# Patient Record
Sex: MALE | Race: WHITE
[De-identification: names, ages, dates, MRNs, and addresses within clinical notes are randomized per-mention and may not be internally consistent; named-entity substitution may affect disease eponyms.]

---

## 2018-10-23 ENCOUNTER — HOSPITAL ENCOUNTER (OUTPATIENT)
Dept: HOSPITAL 31 - C.3T | Age: 68
Setting detail: OBSERVATION
LOS: 2 days | Discharge: HOME | End: 2018-10-25
Attending: INTERNAL MEDICINE | Admitting: INTERNAL MEDICINE
Payer: MEDICARE

## 2018-10-23 VITALS — RESPIRATION RATE: 20 BRPM

## 2018-10-23 DIAGNOSIS — N39.0: Primary | ICD-10-CM

## 2018-10-23 DIAGNOSIS — I10: ICD-10-CM

## 2018-10-23 DIAGNOSIS — Z79.82: ICD-10-CM

## 2018-10-23 DIAGNOSIS — I25.10: ICD-10-CM

## 2018-10-23 DIAGNOSIS — Z95.5: ICD-10-CM

## 2018-10-23 DIAGNOSIS — R31.0: ICD-10-CM

## 2018-10-23 DIAGNOSIS — F17.200: ICD-10-CM

## 2018-10-23 LAB
ALBUMIN SERPL-MCNC: 4.4 G/DL (ref 3.5–5)
ALBUMIN/GLOB SERPL: 1.4 {RATIO} (ref 1–2.1)
ALT SERPL-CCNC: 21 U/L (ref 21–72)
APTT BLD: 28 SECONDS (ref 21–34)
AST SERPL-CCNC: 19 U/L (ref 17–59)
BACTERIA #/AREA URNS HPF: (no result) /[HPF]
BASOPHILS # BLD AUTO: 0 K/UL (ref 0–0.2)
BASOPHILS NFR BLD: 0.4 % (ref 0–2)
BILIRUB UR-MCNC: NEGATIVE MG/DL
BUN SERPL-MCNC: 18 MG/DL (ref 9–20)
CALCIUM SERPL-MCNC: 9.3 MG/DL (ref 8.6–10.4)
EOSINOPHIL # BLD AUTO: 0 K/UL (ref 0–0.7)
EOSINOPHIL NFR BLD: 0.1 % (ref 0–4)
ERYTHROCYTE [DISTWIDTH] IN BLOOD BY AUTOMATED COUNT: 14.1 % (ref 11.5–14.5)
GFR NON-AFRICAN AMERICAN: 43
GLUCOSE UR STRIP-MCNC: NORMAL MG/DL
HDLC SERPL-MCNC: 27 MG/DL (ref 30–70)
HGB BLD-MCNC: 14.5 G/DL (ref 12–18)
INR PPP: 1.2
LDLC SERPL-MCNC: 122 MG/DL (ref 0–129)
LEUKOCYTE ESTERASE UR-ACNC: (no result) LEU/UL
LYMPHOCYTE: 4 % (ref 20–40)
LYMPHOCYTES # BLD AUTO: 0.7 K/UL (ref 1–4.3)
LYMPHOCYTES NFR BLD AUTO: 6.6 % (ref 20–40)
MCH RBC QN AUTO: 29.7 PG (ref 27–31)
MCHC RBC AUTO-ENTMCNC: 33.5 G/DL (ref 33–37)
MCV RBC AUTO: 88.5 FL (ref 80–94)
MONOCYTE: 3 % (ref 0–10)
MONOCYTES # BLD: 0.7 K/UL (ref 0–0.8)
MONOCYTES NFR BLD: 6.5 % (ref 0–10)
NEUTROPHILS # BLD: 9.4 K/UL (ref 1.8–7)
NEUTROPHILS NFR BLD AUTO: 86.4 % (ref 50–75)
NEUTROPHILS NFR BLD AUTO: 90 % (ref 50–75)
NEUTS BAND NFR BLD: 3 % (ref 0–2)
NRBC BLD AUTO-RTO: 0 % (ref 0–2)
PH UR STRIP: 7 [PH] (ref 5–8)
PLATELET # BLD EST: NORMAL 10*3/UL
PLATELET # BLD: 198 K/UL (ref 130–400)
PMV BLD AUTO: 9 FL (ref 7.2–11.7)
PROT UR STRIP-MCNC: (no result) MG/DL
PROTHROMBIN TIME: 13.1 SECONDS (ref 9.7–12.2)
RBC # BLD AUTO: 4.89 MIL/UL (ref 4.4–5.9)
RBC # UR STRIP: (no result) /UL
RBC MORPH BLD: NORMAL
SP GR UR STRIP: 1.02 (ref 1–1.03)
TOTAL CELLS COUNTED BLD: 100
UROBILINOGEN UR-MCNC: NORMAL MG/DL (ref 0.2–1)
WBC # BLD AUTO: 10.9 K/UL (ref 4.8–10.8)
WBC CLUMPS # UR AUTO: (no result) /HPF

## 2018-10-23 PROCEDURE — 84100 ASSAY OF PHOSPHORUS: CPT

## 2018-10-23 PROCEDURE — 85025 COMPLETE CBC W/AUTO DIFF WBC: CPT

## 2018-10-23 PROCEDURE — 80053 COMPREHEN METABOLIC PANEL: CPT

## 2018-10-23 PROCEDURE — 83605 ASSAY OF LACTIC ACID: CPT

## 2018-10-23 PROCEDURE — 84145 PROCALCITONIN (PCT): CPT

## 2018-10-23 PROCEDURE — 83735 ASSAY OF MAGNESIUM: CPT

## 2018-10-23 PROCEDURE — 80061 LIPID PANEL: CPT

## 2018-10-23 PROCEDURE — 81001 URINALYSIS AUTO W/SCOPE: CPT

## 2018-10-23 PROCEDURE — 36592 COLLECT BLOOD FROM PICC: CPT

## 2018-10-23 PROCEDURE — 36415 COLL VENOUS BLD VENIPUNCTURE: CPT

## 2018-10-23 PROCEDURE — 83036 HEMOGLOBIN GLYCOSYLATED A1C: CPT

## 2018-10-23 PROCEDURE — 87086 URINE CULTURE/COLONY COUNT: CPT

## 2018-10-23 PROCEDURE — 88104 CYTOPATH FL NONGYN SMEARS: CPT

## 2018-10-23 PROCEDURE — 87040 BLOOD CULTURE FOR BACTERIA: CPT

## 2018-10-23 PROCEDURE — 71046 X-RAY EXAM CHEST 2 VIEWS: CPT

## 2018-10-23 PROCEDURE — 85610 PROTHROMBIN TIME: CPT

## 2018-10-23 PROCEDURE — 76700 US EXAM ABDOM COMPLETE: CPT

## 2018-10-23 PROCEDURE — 85730 THROMBOPLASTIN TIME PARTIAL: CPT

## 2018-10-23 NOTE — CP.PCM.PN
Subjective





- Date & Time of Evaluation


Date of Evaluation: 10/23/18


Time of Evaluation: 14:40





- Subjective


Subjective: 











PROGRESS NOTE.














DR. DU.














This is a 69 yo male with past medical history of hypertension, bacteremia, 

renal stones, fx of mandible, cardiac stent placement, presenting today as a 

direct admit to [facility]. 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 Patient reports hematuria, polyuria, and intense dysuria x 2 days. 

Patient says that he was also passing blood clots in his urine and the hematuria

was occurring every time he would urinate. He has been on asa 81 mg po daily. He

also reports low grade fever at home. 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 Found to have leukocytosis of

23,000. Patient self prescribed ciprofloxacin 500 bid and pencillin BID. Was 

taking and starting to feel better, but woke up at 5 am this morning with 

returning and worsening sx. Patient spoke to Dr. Du who advised him that he 

should seek inpatient admission and IV antibiotics. Patient currently denies, 

chest pain, fever, cough, shortness of breath, abdominal pain, syncope. 











PMH: Fx of mandible, bacteremia, HTN, renal stones








PSH: cardiac stent placed in 2007








Home meds: bystolic, asa








Allergies: NKDA








FH: DM, HTN in family











Social hx: Current smoker. 1 pack per day for 30 plus yrs. Works as a 

gastroenterologist. 

















Objective





- Medications


Medications: 


                               Current Medications





Meropenem 500 mg/ Sodium (Chloride)  100 mls @ 100 mls/hr IVPB Q8H [facility]; Protocol


Sodium Chloride (Sodium Chloride 0.9%)  1,000 mls @ 100 mls/hr IV .Q10H [facility]


Metoclopramide HCl (Reglan)  5 mg IVP Q8 PRN


   PRN Reason: Nausea/Vomiting


Pantoprazole Sodium (Protonix Ec Tab)  40 mg PO DAILY [facility]











- Constitutional


Appears: Non-toxic, No Acute Distress





- Head Exam


Head Exam: ATRAUMATIC, NORMAL INSPECTION, NORMOCEPHALIC





- Eye Exam


Eye Exam: EOMI





- ENT Exam


ENT Exam: Mucous Membranes Moist





- Neck Exam


Neck Exam: Full ROM, Normal Inspection





- Respiratory Exam


Respiratory Exam: absent: Respiratory Distress





- Cardiovascular Exam


Cardiovascular Exam: +S1, +S2





- GI/Abdominal Exam


GI & Abdominal Exam: Soft, Normal Bowel Sounds.  absent: Tenderness





- Extremities Exam


Extremities Exam: Full ROM, Normal Inspection





- Back Exam


Back Exam: NORMAL INSPECTION.  absent: CVA tenderness (L), CVA tenderness (R)





- Neurological Exam


Neurological Exam: Alert, Awake, Oriented x3





- Psychiatric Exam


Psychiatric exam: Normal Affect, Normal Mood





- Skin


Skin Exam: Dry, Intact, Normal Color, Warm





Assessment and Plan





- Assessment and Plan (Free Text)


Assessment: 




















This is a 69 yo male with past medical hx of CAD, HTN, renal stones, bacteremia,

presenting with 











1. Suspected sepsis











-secondary to UTI


-UA, urine culture pending


-hold asa secondary to hematuria


-blood cultures x 2 pending


-start IV merrem 500 mg q 8 hrs (day one)


-ID consult. recs appreciated. Dr. Mcarthur


-lactic acid pending


-tylenol 650 mg po q 6 hrs prn fever


-CXR pending 


- cc/hr


-procalcitonin pending


-abdominal ultrasound pending


-qSOFA score of 0: not high risk 














2. Hematuria











-ua and urine culture pending


-hold asa


-urology consult. Dr. Coronado. recs appreciated.


-PT/PTT pending 











3. Hx of HTN








-continue to monitor 














4. Hx of CAD








-continue to monitor


-lipid panel


-will check HGB a1c. 











5. Tobacco abuse








-encourage smoking cessation 











6. GI/DVT ppx








-protonix 40 mg daily


-reglan 5 mg iv q 8 hrs 


-SCDs

















discussed with Dr. Du.

## 2018-10-23 NOTE — CP.PCM.PCO
Addendum entered and electronically signed by Vinayak Yeung  10/24/18 00:19: 


House Doctor Paged for patient complaining pain on urination. 





Patient seen and examined at beside. Patient admitted for UTI. Patient currently

on IV abx treatment but continues to have pain on urination. Patient 

additionally is stating he is having abdominal discomfort from the abx. Patient 

has no other complaints. 





Patient given 1 X Phenalzopyridine 100 mg PO & 1X Protonix 40 mg PO to relieve 

symptoms. 





Original Note:

## 2018-10-24 VITALS — TEMPERATURE: 98.8 F | OXYGEN SATURATION: 97 %

## 2018-10-24 LAB
ALBUMIN SERPL-MCNC: 3.6 G/DL (ref 3.5–5)
ALBUMIN/GLOB SERPL: 1.2 {RATIO} (ref 1–2.1)
ALT SERPL-CCNC: 16 U/L (ref 21–72)
AST SERPL-CCNC: 21 U/L (ref 17–59)
BASOPHILS # BLD AUTO: 0 K/UL (ref 0–0.2)
BASOPHILS NFR BLD: 0.5 % (ref 0–2)
BUN SERPL-MCNC: 18 MG/DL (ref 9–20)
CALCIUM SERPL-MCNC: 8.9 MG/DL (ref 8.6–10.4)
EOSINOPHIL # BLD AUTO: 0 K/UL (ref 0–0.7)
EOSINOPHIL NFR BLD: 0.4 % (ref 0–4)
ERYTHROCYTE [DISTWIDTH] IN BLOOD BY AUTOMATED COUNT: 14 % (ref 11.5–14.5)
GFR NON-AFRICAN AMERICAN: 47
HGB BLD-MCNC: 14.1 G/DL (ref 12–18)
LYMPHOCYTES # BLD AUTO: 1.1 K/UL (ref 1–4.3)
LYMPHOCYTES NFR BLD AUTO: 13.1 % (ref 20–40)
MCH RBC QN AUTO: 29.7 PG (ref 27–31)
MCHC RBC AUTO-ENTMCNC: 33.8 G/DL (ref 33–37)
MCV RBC AUTO: 87.9 FL (ref 80–94)
MONOCYTES # BLD: 1.1 K/UL (ref 0–0.8)
MONOCYTES NFR BLD: 13.1 % (ref 0–10)
NEUTROPHILS # BLD: 5.9 K/UL (ref 1.8–7)
NEUTROPHILS NFR BLD AUTO: 72.9 % (ref 50–75)
NRBC BLD AUTO-RTO: 0.1 % (ref 0–2)
PLATELET # BLD: 189 K/UL (ref 130–400)
PMV BLD AUTO: 8.8 FL (ref 7.2–11.7)
RBC # BLD AUTO: 4.76 MIL/UL (ref 4.4–5.9)
WBC # BLD AUTO: 8.1 K/UL (ref 4.8–10.8)

## 2018-10-24 RX ADMIN — PANTOPRAZOLE SODIUM SCH MG: 40 TABLET, DELAYED RELEASE ORAL at 10:25

## 2018-10-24 NOTE — US
HISTORY:

leukocytosis/sepsis



COMPARISON:

None available.



TECHNIQUE:

Sonographic evaluation of the abdomen.



FINDINGS:



LIVER:

Measures 17.6 cm in sagittal dimension.  Echogenic liver may be seen 

in setting of hepatic parenchymal disease or fatty infiltration. No 

focal hepatic mass identified. The main portal vein appears patent 

with normal directional flow.   No intrahepatic bile duct dilatation.



GALLBLADDER:

No gallstones. No gallbladder wall thickening. Negative sonographic 

Moran's sign as assessed by the sonographer.



COMMON BILE DUCT:

Measures 5 mm. 



PANCREAS:

Not well visualized.



RIGHT KIDNEY:

Measures 11.6 x 5.8 x 5.8 cm.  No obstructing calculus or 

hydronephrosis identified.  2.8 x 2.4 x 3.3 cm midpole cyst. 



LEFT KIDNEY:

Measures 10.7 x 4.1 x 5.1 cm.  No obstructing calculus or 

hydronephrosis identified.  Punctate nonobstructing midpole calculus. 



SPLEEN:

Measures approximately 11.6 cm. 



AORTA:

Limited views appear unremarkable. 



IVC:

Limited views appear unremarkable. 



OTHER FINDINGS:

None. 



IMPRESSION:

Echogenic liver may be seen in setting of hepatic parenchymal disease 

or fatty infiltration. 



3.3 cm midpole right renal cyst. 



Punctate nonobstructing left renal calculus. 



Preliminary impression was provided by [facility].

## 2018-10-24 NOTE — RAD
HISTORY:

Fever 



COMPARISON:

No prior.



TECHNIQUE:

Chest PA and lateral



FINDINGS:



LINES AND TUBES:

None. 



LUNG AND PLEURA:

The lungs are well inflated and clear. No pleural effusion or 

pneumothorax.



HEART AND MEDIASTINUM:

The heart is not enlarged.  No aortic atherosclerotic calcification 

present.  The hilar and mediastinal contours are within normal limits.



SKELETAL STRUCTURES:

The bony structures are within normal limits for the patient's age.



VISUALIZED UPPER ABDOMEN:

Normal.



OTHER FINDINGS:

None.



IMPRESSION:

No active pulmonary disease.

## 2018-10-24 NOTE — CP.PCM.PN
Subjective





- Date & Time of Evaluation


Date of Evaluation: 10/24/18


Time of Evaluation: 08:00





- Subjective


Subjective: 





PGY 3 medicine progress note for Dr. Nelson:











Patient was seen and examined at bedside this morning. Patient states he still 

has so dysuria but has greatly improved since admission. Patient has no other 

complaints at this time. Would like to go home today.





Objective





- Vital Signs/Intake and Output


Vital Signs (last 24 hours): 


                                        











Temp Pulse Resp BP Pulse Ox


 


 98.1 F   60   20   161/74 H  96 


 


 10/24/18 08:00  10/24/18 08:00  10/24/18 08:00  10/24/18 08:00  10/24/18 08:00








Intake and Output: 


                                        











 10/24/18 10/24/18





 06:59 18:59


 


Intake Total 1000 


 


Balance 1000 














- Medications


Medications: 


                               Current Medications





Acetaminophen (Tylenol 325mg Tab)  650 mg PO Q6 PRN


   PRN Reason: Fever >100.4 F


   Last Admin: 10/24/18 00:17 Dose:  650 mg


Aspirin (Aspirin Chewable)  81 mg PO DAILY [facility]


   Last Admin: 10/24/18 10:25 Dose:  81 mg


Meropenem 500 mg/ Sodium (Chloride)  100 mls @ 100 mls/hr IVPB Q8H [facility]; Protocol


   Last Admin: 10/24/18 06:43 Dose:  100 mls/hr


Sodium Chloride (Sodium Chloride 0.9%)  1,000 mls @ 100 mls/hr IV .Q10H [facility]


   Last Admin: 10/24/18 00:30 Dose:  Not Given


Influenza Virus Vaccine (Fluzone Quad [id number])  60 mcg IM .ONCE ONE


   Stop: 10/26/18 10:01


Metoclopramide HCl (Reglan)  5 mg IVP Q8 PRN


   PRN Reason: Nausea/Vomiting


   Last Admin: 10/23/18 20:54 Dose:  5 mg


Nebivolol (Bystolic)  10 mg PO DAILY [facility]


Pantoprazole Sodium (Protonix Ec Tab)  40 mg PO DAILY [facility]


   Last Admin: 10/24/18 10:25 Dose:  40 mg


Phenazopyridine HCl (Pyridium)  200 mg PO TID PRN


   PRN Reason: Urinary discomt


   Last Admin: 10/24/18 12:49 Dose:  200 mg


Pneumococcal Polyvalent Vaccine (Pneumovax 23 Vaccine)  0.5 ml IM .ONCE ONE


   Stop: 10/26/18 10:01











- Labs


Labs: 


                                        





                                 10/24/18 08:00 





                                 10/24/18 08:01 





                                        











PT  13.1 SECONDS (9.7-12.2)  H  10/23/18  15:45    


 


INR  1.2   10/23/18  15:45    


 


APTT  28 SECONDS (21-34)   10/23/18  15:45    














- Constitutional


Appears: Non-toxic, No Acute Distress





- Head Exam


Head Exam: NORMAL INSPECTION





- Eye Exam


Eye Exam: EOMI, PERRL


Pupil Exam: NORMAL ACCOMODATION





- ENT Exam


ENT Exam: Mucous Membranes Moist





- Respiratory Exam


Respiratory Exam: Clear to Ausculation Bilateral, NORMAL BREATHING PATTERN.  

absent: Respiratory Distress





- Cardiovascular Exam


Cardiovascular Exam: REGULAR RHYTHM, +S1, +S2





- GI/Abdominal Exam


GI & Abdominal Exam: Soft, Normal Bowel Sounds.  absent: Distended, Firm, 

Guarding, Tenderness





- Extremities Exam


Extremities Exam: Normal Inspection





- Back Exam


Back Exam: NORMAL INSPECTION.  absent: CVA tenderness (L), CVA tenderness (R), 

paraspinal tenderness





- Neurological Exam


Neurological Exam: Alert, Awake, CN II-XII Intact, Normal Gait





- Psychiatric Exam


Psychiatric exam: Normal Affect, Normal Mood





- Skin


Skin Exam: Normal Color





Assessment and Plan





- Assessment and Plan (Free Text)


Assessment: 








This is a 69 yo male with past medical hx of CAD, HTN, renal stones, bacteremia,

presenting with 











1. Suspected sepsis











UTI with Sepsis criteria


Febrile, WBC 22


with hematuria and dysuria


UA, urine culture pending


blood cultures - prelim negative


IV Merrem 500 mg q 8 hrs (started 10/23)


ID consult. recs appreciated. Dr. Mcarthur


Lactic acid normal


Tylenol 650 mg po q 6 hrs prn fever


Pyridium 200mg PO PRN urinary discomfort


CXR pending - normal


Procalcitonin negative


abdominal ultrasound pending


qSOFA score of 0: not high risk 





Hematuria and Dysuria


Resolving 


f/u urine cytology


Urine culture pending


Urology consult. Dr. Coronado. recs appreciated -> rec cysto but will hold off for

now due to active infection


Flomax 0.4 mg PO PCS





Hypertension


Bistolic 10mg PO daily





CAD


Asa 81mg PO daily





Tobacco abuse


Encouraged smoking cessation 


Patient refuses nicotine path





Prophylaxis


Protonix 40 mg daily


Reglan 5 mg iv q 8 hrs 


SCDs








Dispo -> pending authorization for Itrapenem 1gram IV daily via home infusion. 

Likely dc tomorrow 10/25 early in the morning. 





*All management and order per Dr. Nelson

## 2018-10-24 NOTE — CP.PCM.CON
History of Present Illness





- History of Present Illness


History of Present Illness: 





69 yo male  admitted with acute UTI    Patient reports hematuria, polyuria, and 

intense dysuria x 2 days. Patient says that he was also passing blood clots in 

his urine and the hematuria was occurring every time he would urinate. . Found 

to have leukocytosis of 23,000. Patient self prescribed ciprofloxacin 500 bid 








PMH: Fx of mandible, bacteremia, HTN, renal stones


PSH: cardiac stent placed in 2007


Home meds: bystolic, asa


Allergies: NKDA


FH: DM, HTN in family


Social hx: Current smoker. 1 pack per day for 30 plus yrs. Works as a 

gastroenterologist. 














Past Patient History





- Past Medical History & Family History


Past Medical History?: Yes





- Past Social History


Smoking Status: Current Some Days Smoker





- CARDIAC


Hx Cardiac Disorders: No





- PULMONARY


Hx Respiratory Disorders: No





- NEUROLOGICAL


Hx Neurological Disorder: No





- HEENT


Hx HEENT Problems: No





- RENAL


Hx Chronic Kidney Disease: No





- ENDOCRINE/METABOLIC


Hx Endocrine Disorders: No





- HEMATOLOGICAL/ONCOLOGICAL


Hx Blood Disorders: No





- INTEGUMENTARY


Hx Dermatological Problems: No





- MUSCULOSKELETAL/RHEUMATOLOGICAL


Hx Musculoskeletal Disorders: No


Hx Falls: No





- GASTROINTESTINAL


Hx Gastrointestinal Disorders: No





- GENITOURINARY/GYNECOLOGICAL


Hx Genitourinary Disorders: No





- PSYCHIATRIC


Hx Psychophysiologic Disorder: No


Hx Substance Use: Yes





- SURGICAL HISTORY


Hx Surgeries: Yes


Hx Coronary Stent: Yes (2007)





- ANESTHESIA


Hx Anesthesia: Yes


Hx Anesthesia Reactions: No


Hx Malignant Hyperthermia: No


Has any member of the family had a problem w/ anesthesia?: No





Meds


Home Medications: 


                              Home Medication List











 Medication  Instructions  Recorded  Confirmed  Type


 


RX: Aspirin [Aspirin Chewable] 81 mg PO DAILY  chew 10/25/18  Rx


 


RX: Ertapenem [Invanz] 1 gm IVPB ONCE  vial 10/25/18  Rx


 


RX: Phenazopyridine [Pyridium] 200 mg PO TID PRN #3 tab 10/25/18  Rx


 


RX: Tamsulosin [Flomax] 0.4 mg PO PCS  cap 10/25/18  Rx











Allergies/Adverse Reactions: 


                                    Allergies











Allergy/AdvReac Type Severity Reaction Status Date / Time


 


No Known Allergies Allergy   Verified 10/23/18 13:59














- Medications


Medications: 


                               Current Medications





Acetaminophen (Tylenol 325mg Tab)  650 mg PO Q6 PRN


   PRN Reason: Fever >100.4 F


   Last Admin: 10/24/18 00:17 Dose:  650 mg


Aspirin (Aspirin Chewable)  81 mg PO DAILY [facility]


   Last Admin: 10/24/18 10:25 Dose:  81 mg


Ertapenem 1 gm/ Sodium (Chloride)  50 mls @ 100 mls/hr IVPB ONCE ONE; Protocol


   Stop: 10/25/18 07:29


Meropenem 500 mg/ Sodium (Chloride)  100 mls @ 100 mls/hr IVPB Q8H [facility]; Protocol


Influenza Virus Vaccine (Fluzone Quad [id number])  60 mcg IM .ONCE ONE


   Stop: 10/26/18 10:01


Metoclopramide HCl (Reglan)  5 mg IVP Q8 PRN


   PRN Reason: Nausea/Vomiting


   Last Admin: 10/23/18 20:54 Dose:  5 mg


Nebivolol (Bystolic)  10 mg PO DAILY [facility]


Pantoprazole Sodium (Protonix Ec Tab)  40 mg PO DAILY [facility]


   Last Admin: 10/24/18 10:25 Dose:  40 mg


Phenazopyridine HCl (Pyridium)  200 mg PO TID PRN


   PRN Reason: Urinary discomt


   Last Admin: 10/24/18 12:49 Dose:  200 mg


Pneumococcal Polyvalent Vaccine (Pneumovax 23 Vaccine)  0.5 ml IM .ONCE ONE


   Stop: 10/26/18 10:01


Tamsulosin HCl (Flomax)  0.4 mg PO PCS [facility]


   Last Admin: 10/24/18 17:15 Dose:  0.4 mg











Physical Exam





- Constitutional


Appears: Chronically Ill





- Head Exam


Head Exam: ATRAUMATIC, NORMOCEPHALIC





- Eye Exam


Eye Exam: absent: Scleral icterus





- ENT Exam


ENT Exam: Mucous Membranes Dry, Normal External Ear Exam





- Neck Exam


Neck exam: Negative for: Lymphadenopathy





- Respiratory Exam


Respiratory Exam: Decreased Breath Sounds, Clear to Auscultation Bilateral





- Cardiovascular Exam


Cardiovascular Exam: REGULAR RHYTHM, +S1, +S2





- GI/Abdominal Exam


GI & Abdominal Exam: Diminished Bowel Sounds, Soft.  absent: Tenderness





- Rectal Exam


Rectal Exam: Deferred





-  Exam


 Exam: NORMAL INSPECTION





- Extremities Exam


Extremities exam: Negative for: pedal edema





- Back Exam


Back exam: absent: CVA tenderness (L), CVA tenderness (R)





- Neurological Exam


Neurological exam: Alert, CN II-XII Intact, Oriented x3, Reflexes Normal





- Psychiatric Exam


Psychiatric exam: Normal Mood





- Skin


Skin Exam: Dry





Results





- Vital Signs


Recent Vital Signs: 


                                Last Vital Signs











Temp  99.3 F   10/24/18 16:05


 


Pulse  60   10/24/18 16:05


 


Resp  20   10/24/18 16:05


 


BP  155/75 H  10/24/18 16:05


 


Pulse Ox  96   10/24/18 16:05














- Labs


Result Diagrams: 


                                 10/24/18 08:00





                                 10/24/18 08:01


Labs: 


                         Laboratory Results - last 24 hr











  10/23/18 10/24/18 10/24/18





  21:39 08:00 08:01


 


WBC   8.1 


 


RBC   4.76 


 


Hgb   14.1 


 


Hct   41.9 


 


MCV   87.9 


 


MCH   29.7 


 


MCHC   33.8 


 


RDW   14.0 


 


Plt Count   189 


 


MPV   8.8 


 


Neut % (Auto)   72.9 


 


Lymph % (Auto)   13.1 L 


 


Mono % (Auto)   13.1 H 


 


Eos % (Auto)   0.4 


 


Baso % (Auto)   0.5 


 


Neut # (Auto)   5.9 


 


Lymph # (Auto)   1.1 


 


Mono # (Auto)   1.1 H 


 


Eos # (Auto)   0.0 


 


Baso # (Auto)   0.0 


 


Sodium    141


 


Potassium    4.1


 


Chloride    106


 


Carbon Dioxide    23


 


Anion Gap    17


 


BUN    18


 


Creatinine    1.5


 


Est GFR ( Amer)    56


 


Est GFR (Non-Af Amer)    47


 


Random Glucose    112 H


 


Calcium    8.9


 


Phosphorus    3.4


 


Magnesium    2.1


 


Total Bilirubin    0.7


 


AST    21


 


ALT    16 L D


 


Alkaline Phosphatase    57


 


Total Protein    6.8


 


Albumin    3.6


 


Globulin    3.1


 


Albumin/Globulin Ratio    1.2


 


Urine Color  Yellow  


 


Urine Clarity  Hazy  


 


Urine pH  7.0  


 


Ur Specific Gravity  1.018  


 


Urine Protein  2+ H  


 


Urine Glucose (UA)  Normal  


 


Urine Ketones  Negative  


 


Urine Blood  3+ H  


 


Urine Nitrate  Positive H  


 


Urine Bilirubin  Negative  


 


Urine Urobilinogen  Normal  


 


Ur Leukocyte Esterase  3+ H  


 


Urine WBC (Auto)  479 H  


 


Urine RBC (Auto)  265 H  


 


Urine WBC Clumps (Auto)  Many H  


 


Urine Bacteria  Occ H  














Assessment & Plan


(1) UTI (urinary tract infection)


Status: Acute   





(2) Hematuria


Status: Acute   





- Assessment and Plan (Free Text)


Assessment: 





will need IV antibiotics and  follow up for cystoscopy

## 2018-10-25 VITALS — SYSTOLIC BLOOD PRESSURE: 153 MMHG | HEART RATE: 57 BPM | DIASTOLIC BLOOD PRESSURE: 90 MMHG

## 2018-10-25 RX ADMIN — PANTOPRAZOLE SODIUM SCH MG: 40 TABLET, DELAYED RELEASE ORAL at 09:01

## 2018-10-25 NOTE — CP.PCM.PN
Subjective





- Date & Time of Evaluation


Date of Evaluation: 10/25/18


Time of Evaluation: 09:26





- Subjective


Subjective: 


PGY2- medicine progress note for Dr. Nelson





Patient was seen and examined at bedside in no acute distress. Patient reports 

feeling better today. He admits to burning with urination, but states it is 

improving. He denies hematuria. The patient has no additional complaints and 

feels well. Denies chest pain, palpitations, dyspnea, cough, nausea, vomiting, 

diarrhea, constipation, abdominal pain, fevers, headaches, dizziness.





Objective





- Vital Signs/Intake and Output


Vital Signs (last 24 hours): 


                                        











Temp Pulse Resp BP Pulse Ox


 


 98.8 F   57 L  20   153/90 H  97 


 


 10/25/18 07:16  10/25/18 07:16  10/25/18 07:16  10/25/18 07:16  10/25/18 07:16








Intake and Output: 


                                        











 10/25/18 10/25/18





 06:59 18:59


 


Intake Total  390


 


Output Total  450


 


Balance  -60














- Medications


Medications: 


                               Current Medications





Acetaminophen (Tylenol 325mg Tab)  650 mg PO Q6 PRN


   PRN Reason: Fever >100.4 F


   Last Admin: 10/24/18 00:17 Dose:  650 mg


Aspirin (Aspirin Chewable)  81 mg PO DAILY [facility]


   Last Admin: 10/25/18 09:01 Dose:  81 mg


Meropenem 500 mg/ Sodium (Chloride)  100 mls @ 100 mls/hr IVPB Q8H [facility]; Protocol


   Last Admin: 10/25/18 09:10 Dose:  Not Given


Influenza Virus Vaccine (Fluzone Quad [id number])  60 mcg IM .ONCE ONE


   Stop: 10/26/18 10:01


Metoclopramide HCl (Reglan)  5 mg IVP Q8 PRN


   PRN Reason: Nausea/Vomiting


   Last Admin: 10/23/18 20:54 Dose:  5 mg


Nebivolol (Bystolic)  10 mg PO DAILY [facility]


   Last Admin: 10/25/18 06:02 Dose:  10 mg


Pantoprazole Sodium (Protonix Ec Tab)  40 mg PO DAILY [facility]


   Last Admin: 10/25/18 09:01 Dose:  40 mg


Phenazopyridine HCl (Pyridium)  200 mg PO TID PRN


   PRN Reason: Urinary discomt


   Last Admin: 10/25/18 09:04 Dose:  200 mg


Pneumococcal Polyvalent Vaccine (Pneumovax 23 Vaccine)  0.5 ml IM .ONCE ONE


   Stop: 10/26/18 10:01


Tamsulosin HCl (Flomax)  0.4 mg PO PCS [facility]


   Last Admin: 10/24/18 17:15 Dose:  0.4 mg











- Labs


Labs: 


                                        





                                 10/24/18 08:00 





                                 10/24/18 08:01 





                                        











PT  13.1 SECONDS (9.7-12.2)  H  10/23/18  15:45    


 


INR  1.2   10/23/18  15:45    


 


APTT  28 SECONDS (21-34)   10/23/18  15:45    














- Constitutional


Appears: No Acute Distress





- Head Exam


Head Exam: ATRAUMATIC, NORMAL INSPECTION





- Eye Exam


Eye Exam: EOMI





- ENT Exam


ENT Exam: Mucous Membranes Moist





- Respiratory Exam


Respiratory Exam: Clear to Ausculation Bilateral, NORMAL BREATHING PATTERN.  

absent: Rales, Rhonchi, Wheezes, Respiratory Distress





- Cardiovascular Exam


Cardiovascular Exam: REGULAR RHYTHM, +S1, +S2





- GI/Abdominal Exam


GI & Abdominal Exam: Soft, Normal Bowel Sounds.  absent: Distended, Firm, 

Guarding, Tenderness





- Extremities Exam


Extremities Exam: Normal Inspection.  absent: Pedal Edema, Tenderness


Additional comments: 


Midline in left UE- clean, dry, intact.





- Back Exam


Back Exam: NORMAL INSPECTION.  absent: CVA tenderness (L), CVA tenderness (R)





- Neurological Exam


Neurological Exam: Alert, Awake, Oriented x3





- Psychiatric Exam


Psychiatric exam: Normal Affect, Normal Mood





- Skin


Skin Exam: Dry, Normal Color, Warm





Assessment and Plan





- Assessment and Plan (Free Text)


Plan: 





This is a 67 yo male with past medical hx of CAD, HTN, renal stones, bacteremia,

presenting with 





UTI with Sepsis criteria


Febrile, WBC 22


with hematuria and dysuria


UA, urine culture pending


blood cultures - prelim negative


IV Merrem 500 mg q 8 hrs (started 10/23)


ID consult. recs appreciated. Dr. Mcarthur


Lactic acid normal


Tylenol 650 mg po q 6 hrs prn fever


Pyridium 200mg PO PRN urinary discomfort


CXR - normal


Procalcitonin-negative


abdominal ultrasound pending


qSOFA score of 0: not high risk 





Hematuria and Dysuria


Resolving 


f/u urine cytology


Urine culture- gram negative janny


Urology consult. Dr. Coronado. recs appreciated -> rec cysto but will hold off for

now due to active infection


Flomax 0.4 mg PO PCS





Hypertension


Bistolic 10mg PO daily





CAD


Asa 81mg PO daily





Tobacco abuse


Encouraged smoking cessation 


Patient refuses nicotine patch





Prophylaxis


Protonix 40 mg daily


Reglan 5 mg iv q 8 hrs 


SCDs








Patient is stable for discharge today, 10/25/18, per Dr. Nelson. Patient must 

continue Ertapenem 1 gram IV daily via home infusion x 10 days. Patient must 

continue home medications as prescribed. Patient must follow up with PMD, 

Urologist (Dr. Ferguson), and ID (Dr. Mcarthur), within 1-2 weeks of discharge.





*All management and order per Dr. Nelson

## 2018-10-26 NOTE — HP
HISTORY OF PRESENT ILLNESS:  A 68-year-old male chief complaint suspected

sepsis, hematuria, dysuria.  The patient came to the ER.



PHYSICAL EXAMINATION:

GENERAL:  The patient is awake, alert and oriented.

VITAL SIGNS:  Temperature 98, pulse 90.

HEENT:  Within normal limits.

NECK:  Supple.

CHEST:  Symmetrical.

HEART:  Regular.

ABDOMEN:  Soft.

EXTREMITIES:  No edema.



ASSESSMENT AND PLAN: Patient suffers from hematuria. Patient getting bed

rest, IV antibiotics, supportive care, urology consult.





__________________________________________

Addi Nelson MD





DD:  10/25/2018 11:00:42

DT:  10/25/2018 15:57:12

Job # [id number]